# Patient Record
Sex: MALE | Race: WHITE | URBAN - METROPOLITAN AREA
[De-identification: names, ages, dates, MRNs, and addresses within clinical notes are randomized per-mention and may not be internally consistent; named-entity substitution may affect disease eponyms.]

---

## 2017-01-04 ENCOUNTER — ALLIED HEALTH/NURSE VISIT (OUTPATIENT)
Dept: NURSING | Facility: CLINIC | Age: 37
End: 2017-01-04
Payer: COMMERCIAL

## 2017-01-04 DIAGNOSIS — J02.0 ACUTE STREPTOCOCCAL PHARYNGITIS: Primary | ICD-10-CM

## 2017-01-04 LAB
DEPRECATED S PYO AG THROAT QL EIA: NORMAL
MICRO REPORT STATUS: NORMAL
SPECIMEN SOURCE: NORMAL

## 2017-01-04 PROCEDURE — 87081 CULTURE SCREEN ONLY: CPT | Performed by: PEDIATRICS

## 2017-01-04 PROCEDURE — 87880 STREP A ASSAY W/OPTIC: CPT | Performed by: PEDIATRICS

## 2017-01-04 PROCEDURE — 99207 ZZC NO CHARGE NURSE ONLY: CPT

## 2017-01-04 NOTE — Clinical Note
[facility]  [address] [facility] [place] [address]  [phone]    January 9, 2017       Cash Waggoner  [address]  [place] [address]        Cash     The laboratory tests drawn at your child's last visit all returned with normal results.    Results for orders placed or performed in visit on 01/04/17   Strep, Rapid Screen   Result Value Ref Range    Specimen Description Throat     Rapid Strep A Screen       NEGATIVE: No Group A streptococcal antigen detected by immunoassay, await   culture report.      Micro Report Status FINAL 01/04/2017    Beta strep group A culture   Result Value Ref Range    Specimen Description Throat     Culture Micro No Beta Streptococcus isolated     Micro Report Status FINAL 01/06/2017      If you have any questions or concerns please call the clinic at [phone].    Kerline Peng M.D/leonora

## 2017-01-06 LAB
BACTERIA SPEC CULT: NORMAL
MICRO REPORT STATUS: NORMAL
SPECIMEN SOURCE: NORMAL

## 2017-01-06 NOTE — PROGRESS NOTES
Quick Note:    The laboratory tests drawn at your child's last visit all returned with normal results.    Please call me if you have any questions that can not wait until our next visit in the clinic.    Dr. Peng  ______

## 2019-08-01 ENCOUNTER — OFFICE VISIT (OUTPATIENT)
Dept: ORTHOPEDICS | Facility: CLINIC | Age: 39
End: 2019-08-01
Payer: COMMERCIAL

## 2019-08-01 ENCOUNTER — ANCILLARY PROCEDURE (OUTPATIENT)
Dept: GENERAL RADIOLOGY | Facility: CLINIC | Age: 39
End: 2019-08-01
Attending: FAMILY MEDICINE
Payer: COMMERCIAL

## 2019-08-01 VITALS — DIASTOLIC BLOOD PRESSURE: 84 MMHG | HEIGHT: 71 IN | SYSTOLIC BLOOD PRESSURE: 130 MMHG

## 2019-08-01 DIAGNOSIS — M25.531 ACUTE PAIN OF RIGHT WRIST: ICD-10-CM

## 2019-08-01 DIAGNOSIS — M25.521 RIGHT ELBOW PAIN: Primary | ICD-10-CM

## 2019-08-01 DIAGNOSIS — M25.521 RIGHT ELBOW PAIN: ICD-10-CM

## 2019-08-01 PROCEDURE — 99203 OFFICE O/P NEW LOW 30 MIN: CPT | Performed by: FAMILY MEDICINE

## 2019-08-01 PROCEDURE — 73110 X-RAY EXAM OF WRIST: CPT | Mod: RT

## 2019-08-01 PROCEDURE — 73080 X-RAY EXAM OF ELBOW: CPT | Mod: RT

## 2019-08-01 NOTE — LETTER
8/1/2019         RE: Cash Waggoner  [address] [facility]  [place] [address]        Dear Colleague,    Thank you for referring your patient, Cash Waggoner, to the [facility] [place]. Please see a copy of my visit note below.    ASSESSMENT & PLAN  Cash was seen today for pain.    Diagnoses and all orders for this visit:    Right elbow pain  -     XR Elbow RT G/E 3 vw; Future    Acute pain of right wrist  -     XR Wrist Right G/E 3 Views; Future      Patient is a 39 year old male presenting for evaluation of   Chief Complaint   Patient presents with     Right Elbow - Pain      # Proximal Radius Fracture: Non-displaced fx after fall on 7/31/19.  Pt has swelling and TTP over radial head.  Plan to place in sling and f/u in one week for repeat imaging with plan for 3 weeks of immobilization    # Right Wrist Pain: Notable in snuffbox mild in nature after FOOSH injury.  Initial XR neg for fx.  Plan to rest and reevaluate next week.  Likely wrist sprain unless new/worsening pain noted    Treatment: Right arm sling  Physical Therapy none  Injection none  Medications  Limited NSAIDs/Tylenol    Concerning signs/sx that would warrant urgent evaluation were discussed.  All questions were answered, patient understands and agrees with plan.      Return in about 1 week (around 8/8/2019).    -----    SUBJECTIVE  Cash Waggoner is a/an 39 year old Right handed male who is seen as a self referral for evaluation of right elbow pain. The patient is seen with their wife.    Onset: 7/31/19, 1 day(s) ago. Patient describes injury as chasing kids, fell backwards. FOOSH injury.   Location of Pain: right elbow to hand   Rating of Pain at worst: 7/10  Rating of Pain Currently: 3/10  Worsened by: extension, flexion   Better with: rest  Treatments tried: elevation, ice, sling, Ibuprofen   Associated symptoms: swelling  Orthopedic history: NO  Relevant surgical history: NO  History reviewed. No pertinent  "past medical history.  Social History     Socioeconomic History     Marital status:      Spouse name: Not on file     Number of children: Not on file     Years of education: Not on file     Highest education level: Not on file   Occupational History     Not on file   Social Needs     Financial resource strain: Not on file     Food insecurity:     Worry: Not on file     Inability: Not on file     Transportation needs:     Medical: Not on file     Non-medical: Not on file   Tobacco Use     Smoking status: Not on file   Substance and Sexual Activity     Alcohol use: Not on file     Drug use: Not on file     Sexual activity: Not on file   Lifestyle     Physical activity:     Days per week: Not on file     Minutes per session: Not on file     Stress: Not on file   Relationships     Social connections:     Talks on phone: Not on file     Gets together: Not on file     Attends Mormonism service: Not on file     Active member of club or organization: Not on file     Attends meetings of clubs or organizations: Not on file     Relationship status: Not on file     Intimate partner violence:     Fear of current or ex partner: Not on file     Emotionally abused: Not on file     Physically abused: Not on file     Forced sexual activity: Not on file   Other Topics Concern     Not on file   Social History Narrative     Not on file       Patient's past medical, surgical, social, and family histories were reviewed today and no changes are noted.    REVIEW OF SYSTEMS:  10 point ROS is negative other than symptoms noted above in HPI, Past Medical History or as stated below  Constitutional: NEGATIVE for fever, chills, change in weight  Skin: NEGATIVE for worrisome rashes, moles or lesions  GI/: NEGATIVE for bowel or bladder changes  Neuro: NEGATIVE for weakness, dizziness or paresthesias    OBJECTIVE:  /84   Ht 1.803 m (5' 11\")    General: healthy, alert and in no distress  HEENT: no scleral icterus or conjunctival " erythema  Skin: no suspicious lesions or rash. No jaundice.  CV: regular rhythm by palpation  Resp: normal respiratory effort without conversational dyspnea   Psych: normal mood and affect  Gait: normal steady gait with appropriate coordination and balance  Neuro: Normal sensory exam of bilateral hands.   MSK:  RIGHT ELBOW  Inspection:  Swelling over lateral/posterior elbow  Palpation:    Tender about the radial head/neck. Remainder of bony, ligamentous and tendinous landmarks are nontender.    Crepitus is Absent  Range of Motion:     Extension 15 / flexion 120 / pronation limited by pain, limited by stiffness / supination limited by pain, limited by stiffness  Strength:    Flexion limited slightly by pain extension limited substantially by pain pronation limited slightly by pain supination limited substantially by pain  Special Tests:    Positive: Pain with resisted wrist extension    Negative: pain with resisted wrist flexion    Independent visualization of the below image:  Recent Results (from the past 24 hour(s))   XR Elbow RT G/E 3 vw    Narrative    XR RIGHT ELBOW THREE OR MORE VIEWS   8/1/2019 6:11 PM     HISTORY: Right elbow pain.    COMPARISON: None.    FINDINGS: There is a subtle lucency extending through the radial head  longitudinally on the oblique view concerning for radial head  fracture. Additionally, there are anterior and posterior fat pad  elevation which also correspond with an elbow fracture. No other  fractures are seen. Joint spaces are grossly well-maintained.      Impression    IMPRESSION: Not significantly displaced radial head fracture as  described above.    SHAMIKA PARIKH MD   XR Wrist Right G/E 3 Views    Narrative    XR WRIST RT G/E 3 VW 8/1/2019 6:30 PM     HISTORY: Pain following injury one day prior.    COMPARISON: None.      Impression    IMPRESSION: No fractures are identified. Normal alignment of the  carpus. Accessory ossicle adjacent to the ulnar styloid.    SAMANTA DARLING MD        Patient's conditions were thoroughly discussed during today's visit with greater than 50% of the visit spent counseling the patient with total time spent face-to-face with the patient being 30 minutes.    Anders Gibson MD, [facility]        Again, thank you for allowing me to participate in the care of your patient.        Sincerely,        Anders Gibson MD

## 2019-08-01 NOTE — PATIENT INSTRUCTIONS
Diagnosis: Right proximal radial fracture  Image Findings: Non-displaced radial fracture  Treatment: Sling  Medications: Limited tylenol/ibuprofen  Follow-up: 1 week    Patient Education     Elbow Fracture    You have a break, or fracture, of the elbow. That means you have a crack or break in one or more of the bones of the elbow joint. Fractures usually take 4 to 12 weeks to heal, depending on the type. Initial treatment is with a splint or cast. Severe fractures may need surgery to put the bone fragments back into place.  The elbow joint is formed by 3 arm bones:    Radius. This is the bone on the thumb side of the forearm.    Ulna. This is the bone on the little-finger side of the forearm. The ulna forms the tip of the elbow.    Humerus. This is the upper arm bone that connects to the shoulder.  Home care    Keep your arm raised to reduce pain and swelling. When sitting or lying down raise your arm above heart level. You can do this by placing your arm on a pillow that rests on your chest or on a pillow at your side. This is most important during the first 48 hours after injury.    Apply an ice pack over the injured area for 15 to 20 minutes every 3 to 6 hours. You should do this for the first 24 to 48 hours. To make an ice pack, put ice cubes in a plastic bag that seals at the top. Wrap the bag in a clean, thin towel or cloth. Never put ice or an ice pack directly on your skin. You can place the ice pack inside the sling and directly over the splint. Keep using ice packs to ease pain and swelling as needed. As the ice melts, be careful to not get your wrap, splint, or cast wet. After 48 hours, apply heat (warm shower or warm bath) for 15 to 20 minutes several times a day. Or switch between ice and heat.    If you were given a sling and splint, leave it in place for the time advised. Keep the splint completely dry at all times. Bathe with your splint out of the water protected with 2 large plastic bags, one  outside of the other, each sealed with duct tape or rubber bands at the top end. If a fiberglass splint or cast gets wet, you can dry it with a hair dryer on a cool setting.    If you were given a sling only, wear it for the first week. Unless told otherwise, slowly begin range of motion exercises, after the first week, or as advised by your healthcare provider.    You may use over-the-counter pain medicine to control pain, unless another pain medicine was prescribed. If you have chronic liver or kidney disease or ever had a stomach ulcer or GI (gastrointestinal) bleeding, talk with your healthcare provider about using these medicines.  Follow-up care  Follow up with your healthcare provider, or as advised.  An elbow joint will become stiff if kept still (immobile) for too long. Ask your healthcare provider when to begin range of motion exercises to prevent the elbow from getting stiff. If X-rays were taken, you will be told if there are any new findings that may affect your care.  When to seek medical advice  Call your healthcare provider right away if any of these occur:    The plaster splint or cast becomes wet or soft    The splint or cast becomes loose    The fiberglass splint or cast remains wet for more than 24 hours    Increased tightness or pain develops in the elbow    A bad smell comes from the cast or splint    Fingers become swollen, cold, blue, numb, or tingly  Date Last Reviewed: 4/1/2018 2000-2018 [employer]. [address] [place] [address]. All rights reserved. This information is not intended as a substitute for professional medical care. Always follow your healthcare professional's instructions.

## 2019-08-01 NOTE — PROGRESS NOTES
ASSESSMENT & PLAN  Cash was seen today for pain.    Diagnoses and all orders for this visit:    Right elbow pain  -     XR Elbow RT G/E 3 vw; Future    Acute pain of right wrist  -     XR Wrist Right G/E 3 Views; Future      Patient is a 39 year old male presenting for evaluation of   Chief Complaint   Patient presents with     Right Elbow - Pain      # Proximal Radius Fracture: Non-displaced fx after fall on 7/31/19.  Pt has swelling and TTP over radial head.  Plan to place in sling and f/u in one week for repeat imaging with plan for 3 weeks of immobilization    # Right Wrist Pain: Notable in snuffbox mild in nature after FOOSH injury.  Initial XR neg for fx.  Plan to rest and reevaluate next week.  Likely wrist sprain unless new/worsening pain noted    Treatment: Right arm sling  Physical Therapy none  Injection none  Medications  Limited NSAIDs/Tylenol    Concerning signs/sx that would warrant urgent evaluation were discussed.  All questions were answered, patient understands and agrees with plan.      Return in about 1 week (around 8/8/2019).    -----    SUBJECTIVE  Cash Waggoner is a/an 39 year old Right handed male who is seen as a self referral for evaluation of right elbow pain. The patient is seen with their wife.    Onset: 7/31/19, 1 day(s) ago. Patient describes injury as chasing kids, fell backwards. FOOSH injury.   Location of Pain: right elbow to hand   Rating of Pain at worst: 7/10  Rating of Pain Currently: 3/10  Worsened by: extension, flexion   Better with: rest  Treatments tried: elevation, ice, sling, Ibuprofen   Associated symptoms: swelling  Orthopedic history: NO  Relevant surgical history: NO  History reviewed. No pertinent past medical history.  Social History     Socioeconomic History     Marital status:      Spouse name: Not on file     Number of children: Not on file     Years of education: Not on file     Highest education level: Not on file   Occupational History     Not  "on file   Social Needs     Financial resource strain: Not on file     Food insecurity:     Worry: Not on file     Inability: Not on file     Transportation needs:     Medical: Not on file     Non-medical: Not on file   Tobacco Use     Smoking status: Not on file   Substance and Sexual Activity     Alcohol use: Not on file     Drug use: Not on file     Sexual activity: Not on file   Lifestyle     Physical activity:     Days per week: Not on file     Minutes per session: Not on file     Stress: Not on file   Relationships     Social connections:     Talks on phone: Not on file     Gets together: Not on file     Attends Catholic service: Not on file     Active member of club or organization: Not on file     Attends meetings of clubs or organizations: Not on file     Relationship status: Not on file     Intimate partner violence:     Fear of current or ex partner: Not on file     Emotionally abused: Not on file     Physically abused: Not on file     Forced sexual activity: Not on file   Other Topics Concern     Not on file   Social History Narrative     Not on file       Patient's past medical, surgical, social, and family histories were reviewed today and no changes are noted.    REVIEW OF SYSTEMS:  10 point ROS is negative other than symptoms noted above in HPI, Past Medical History or as stated below  Constitutional: NEGATIVE for fever, chills, change in weight  Skin: NEGATIVE for worrisome rashes, moles or lesions  GI/: NEGATIVE for bowel or bladder changes  Neuro: NEGATIVE for weakness, dizziness or paresthesias    OBJECTIVE:  /84   Ht 1.803 m (5' 11\")    General: healthy, alert and in no distress  HEENT: no scleral icterus or conjunctival erythema  Skin: no suspicious lesions or rash. No jaundice.  CV: regular rhythm by palpation  Resp: normal respiratory effort without conversational dyspnea   Psych: normal mood and affect  Gait: normal steady gait with appropriate coordination and balance  Neuro: " Normal sensory exam of bilateral hands.   MSK:  RIGHT ELBOW  Inspection:  Swelling over lateral/posterior elbow  Palpation:    Tender about the radial head/neck. Remainder of bony, ligamentous and tendinous landmarks are nontender.    Crepitus is Absent  Range of Motion:     Extension 15 / flexion 120 / pronation limited by pain, limited by stiffness / supination limited by pain, limited by stiffness  Strength:    Flexion limited slightly by pain extension limited substantially by pain pronation limited slightly by pain supination limited substantially by pain  Special Tests:    Positive: Pain with resisted wrist extension    Negative: pain with resisted wrist flexion    Independent visualization of the below image:  Recent Results (from the past 24 hour(s))   XR Elbow RT G/E 3 vw    Narrative    XR RIGHT ELBOW THREE OR MORE VIEWS   8/1/2019 6:11 PM     HISTORY: Right elbow pain.    COMPARISON: None.    FINDINGS: There is a subtle lucency extending through the radial head  longitudinally on the oblique view concerning for radial head  fracture. Additionally, there are anterior and posterior fat pad  elevation which also correspond with an elbow fracture. No other  fractures are seen. Joint spaces are grossly well-maintained.      Impression    IMPRESSION: Not significantly displaced radial head fracture as  described above.    SHAMIKA PARIKH MD   XR Wrist Right G/E 3 Views    Narrative    XR WRIST RT G/E 3 VW 8/1/2019 6:30 PM     HISTORY: Pain following injury one day prior.    COMPARISON: None.      Impression    IMPRESSION: No fractures are identified. Normal alignment of the  carpus. Accessory ossicle adjacent to the ulnar styloid.    SAMANTA DARLING MD       Patient's conditions were thoroughly discussed during today's visit with greater than 50% of the visit spent counseling the patient with total time spent face-to-face with the patient being 30 minutes.    Anders Gibson MD, [facility]

## 2019-08-08 ENCOUNTER — OFFICE VISIT (OUTPATIENT)
Dept: ORTHOPEDICS | Facility: CLINIC | Age: 39
End: 2019-08-08
Payer: COMMERCIAL

## 2019-08-08 ENCOUNTER — ANCILLARY PROCEDURE (OUTPATIENT)
Dept: GENERAL RADIOLOGY | Facility: CLINIC | Age: 39
End: 2019-08-08
Attending: FAMILY MEDICINE
Payer: COMMERCIAL

## 2019-08-08 VITALS — HEIGHT: 71 IN | SYSTOLIC BLOOD PRESSURE: 132 MMHG | DIASTOLIC BLOOD PRESSURE: 78 MMHG

## 2019-08-08 DIAGNOSIS — M25.521 RIGHT ELBOW PAIN: ICD-10-CM

## 2019-08-08 DIAGNOSIS — S52.124D CLOSED NONDISPLACED FRACTURE OF HEAD OF RIGHT RADIUS WITH ROUTINE HEALING, SUBSEQUENT ENCOUNTER: Primary | ICD-10-CM

## 2019-08-08 PROCEDURE — 99214 OFFICE O/P EST MOD 30 MIN: CPT | Performed by: FAMILY MEDICINE

## 2019-08-08 PROCEDURE — 73080 X-RAY EXAM OF ELBOW: CPT | Mod: RT

## 2019-08-08 NOTE — PATIENT INSTRUCTIONS
Diagnosis: Right Radial Head Fracture  Image Findings: Stable fracture at radial head  Treatment: Sling as needed over the next week then out completely  Medications: Limited tylenol/ibuprofen  Follow-up: 2 weeks if pain not completely resolved    It was great seeing you again today!    Anders Gibson

## 2019-08-08 NOTE — PROGRESS NOTES
.ASSESSMENT & PLAN  Cash was seen today for pain.    Diagnoses and all orders for this visit:    Closed nondisplaced fracture of head of right radius with routine healing, subsequent encounter    Right elbow pain  -     XR Elbow RT G/E 3 vw; Future      Patient is a 39 year old male presenting for evaluation of   Chief Complaint   Patient presents with     Right Elbow - Pain      # Proximal Radius Fracture: Non-displaced fx after fall on 7/31/19.  Pain sig improved very mild with end flexion and mild TTP over radial head.  XR showing stable alignment.  Plan to have pt be in sling over next week as needed then out completely.  F/u in 2 weeks if pain not completely resolved.    # Right Wrist Pain: Pain resolved likely wrist sprain f/u PRN for this.      Treatment: Right arm sling as needed for one week then out completely  Physical Therapy none  Injection none  Medications  Limited NSAIDs/Tylenol    Concerning signs/sx that would warrant urgent evaluation were discussed.  All questions were answered, patient understands and agrees with plan.      Return in about 2 weeks (around 8/22/2019).    -----    SUBJECTIVE  Cash Waggoner is a/an 39 year old Right handed male who is seen as a self referral for evaluation of right elbow pain. The patient is seen with their wife.    Onset: 7/31/19, 1 day(s) ago. Patient describes injury as chasing kids, fell backwards. FOOSH injury.   Location of Pain: right elbow to hand   Rating of Pain at worst: 7/10  Rating of Pain Currently: 3/10  Worsened by: extension, flexion   Better with: rest  Treatments tried: elevation, ice, sling, Ibuprofen   Associated symptoms: swelling  Orthopedic history: NO  Relevant surgical history: NO    Interim History - August 8, 2019  Since last visit on 8/1/2019 patient has nearly resolved pain at elbow, very mild at wrist.  No interim injury.       History reviewed. No pertinent past medical history.  Social History     Socioeconomic History      "Marital status:      Spouse name: Not on file     Number of children: Not on file     Years of education: Not on file     Highest education level: Not on file   Occupational History     Not on file   Social Needs     Financial resource strain: Not on file     Food insecurity:     Worry: Not on file     Inability: Not on file     Transportation needs:     Medical: Not on file     Non-medical: Not on file   Tobacco Use     Smoking status: Not on file   Substance and Sexual Activity     Alcohol use: Not on file     Drug use: Not on file     Sexual activity: Not on file   Lifestyle     Physical activity:     Days per week: Not on file     Minutes per session: Not on file     Stress: Not on file   Relationships     Social connections:     Talks on phone: Not on file     Gets together: Not on file     Attends Bahai service: Not on file     Active member of club or organization: Not on file     Attends meetings of clubs or organizations: Not on file     Relationship status: Not on file     Intimate partner violence:     Fear of current or ex partner: Not on file     Emotionally abused: Not on file     Physically abused: Not on file     Forced sexual activity: Not on file   Other Topics Concern     Not on file   Social History Narrative     Not on file       Patient's past medical, surgical, social, and family histories were reviewed today and no changes are noted.    No family history pertinent to the patient's problem today      REVIEW OF SYSTEMS:  10 point ROS is negative other than symptoms noted above in HPI, Past Medical History or as stated below  Constitutional: NEGATIVE for fever, chills, change in weight  Skin: NEGATIVE for worrisome rashes, moles or lesions  GI/: NEGATIVE for bowel or bladder changes  Neuro: NEGATIVE for weakness, dizziness or paresthesias    OBJECTIVE:  /78   Ht 1.803 m (5' 11\")    General: healthy, alert and in no distress  HEENT: no scleral icterus or conjunctival " erythema  Skin: no suspicious lesions or rash. No jaundice.  CV: regular rhythm by palpation  Resp: normal respiratory effort without conversational dyspnea   Psych: normal mood and affect  Gait: normal steady gait with appropriate coordination and balance  Neuro: Normal sensory exam of bilateral hands.   MSK:  RIGHT ELBOW  Inspection:  Resolved swelling over lateral/posterior elbow  Palpation:    Very mild TTP about the radial head/neck. Remainder of bony, ligamentous and tendinous landmarks are nontender.    Crepitus is Absent  Range of Motion:     Extension 0 / flexion 145 mild pain / pronation full / supination full  Strength:    Flexion full, extension full, pronation full, supination full  Special Tests:    Positive: None    Negative: pain with resisted wrist flexion/extension    Hand/wrist (Right):  No deformity noted.  No edema, ecchymosis.  Full range of motion in forearm with pronation and supination.  Full range of motion in wrist with flexion, extension, radial and ulnar deviation.  Full  and intrinsic strength.  Radial pulses normal, +2/4, capillary refill brisk.  No tenderness to palpation over distal radius, ulna, carpal bones, metacarpals, fingers.  No tenderness thenar or hypothenar eminences.  No snuffbox tenderness.  Tinel, Phalen signs negative.  Finkelstein negative.  Intact and symmetric strength and sensation in the median/radial/unlar distributions bilaterally    Independent visualization of the below image:  Recent Results (from the past 24 hour(s))   XR Elbow RT G/E 3 vw    Narrative    RIGHT ELBOW THREE VIEWS   8/8/2019 9:30 AM    HISTORY: Right elbow pain. Fracture follow-up.    COMPARISON: 8/1/2019      Impression    IMPRESSION: There has been no definite change in the subtle linear  lucency involving the articular surface of the radial head consistent  with a fracture. No definite progression of healing is seen. There has  been resolution of the previously seen joint effusion. No  other change  or abnormality is noted.     ANGELA DYSON MD     Patient's conditions were thoroughly discussed during today's visit with greater than 50% of the visit spent counseling the patient with total time spent face-to-face with the patient being 30 minutes.    Anders Gibson MD, [facility]

## 2019-08-08 NOTE — LETTER
8/8/2019         RE: Cash Waggoner  [id number] [facility]  [place] [address]        Dear Colleague,    Thank you for referring your patient, Cash Waggoner, to the [facility] [place]. Please see a copy of my visit note below.    .ASSESSMENT & PLAN  Cash was seen today for pain.    Diagnoses and all orders for this visit:    Closed nondisplaced fracture of head of right radius with routine healing, subsequent encounter    Right elbow pain  -     XR Elbow RT G/E 3 vw; Future      Patient is a 39 year old male presenting for evaluation of   Chief Complaint   Patient presents with     Right Elbow - Pain      # Proximal Radius Fracture: Non-displaced fx after fall on 7/31/19.  Pain sig improved very mild with end flexion and mild TTP over radial head.  XR showing stable alignment.  Plan to have pt be in sling over next week as needed then out completely.  F/u in 2 weeks if pain not completely resolved.    # Right Wrist Pain: Pain resolved likely wrist sprain f/u PRN for this.      Treatment: Right arm sling as needed for one week then out completely  Physical Therapy none  Injection none  Medications  Limited NSAIDs/Tylenol    Concerning signs/sx that would warrant urgent evaluation were discussed.  All questions were answered, patient understands and agrees with plan.      Return in about 2 weeks (around 8/22/2019).    -----    SUBJECTIVE  Cash Waggoner is a/an 39 year old Right handed male who is seen as a self referral for evaluation of right elbow pain. The patient is seen with their wife.    Onset: 7/31/19, 1 day(s) ago. Patient describes injury as chasing kids, fell backwards. FOOSH injury.   Location of Pain: right elbow to hand   Rating of Pain at worst: 7/10  Rating of Pain Currently: 3/10  Worsened by: extension, flexion   Better with: rest  Treatments tried: elevation, ice, sling, Ibuprofen   Associated symptoms: swelling  Orthopedic history: NO  Relevant surgical  history: NO    Interim History - August 8, 2019  Since last visit on 8/1/2019 patient has nearly resolved pain at elbow, very mild at wrist.  No interim injury.       History reviewed. No pertinent past medical history.  Social History     Socioeconomic History     Marital status:      Spouse name: Not on file     Number of children: Not on file     Years of education: Not on file     Highest education level: Not on file   Occupational History     Not on file   Social Needs     Financial resource strain: Not on file     Food insecurity:     Worry: Not on file     Inability: Not on file     Transportation needs:     Medical: Not on file     Non-medical: Not on file   Tobacco Use     Smoking status: Not on file   Substance and Sexual Activity     Alcohol use: Not on file     Drug use: Not on file     Sexual activity: Not on file   Lifestyle     Physical activity:     Days per week: Not on file     Minutes per session: Not on file     Stress: Not on file   Relationships     Social connections:     Talks on phone: Not on file     Gets together: Not on file     Attends Christian service: Not on file     Active member of club or organization: Not on file     Attends meetings of clubs or organizations: Not on file     Relationship status: Not on file     Intimate partner violence:     Fear of current or ex partner: Not on file     Emotionally abused: Not on file     Physically abused: Not on file     Forced sexual activity: Not on file   Other Topics Concern     Not on file   Social History Narrative     Not on file       Patient's past medical, surgical, social, and family histories were reviewed today and no changes are noted.    No family history pertinent to the patient's problem today      REVIEW OF SYSTEMS:  10 point ROS is negative other than symptoms noted above in HPI, Past Medical History or as stated below  Constitutional: NEGATIVE for fever, chills, change in weight  Skin: NEGATIVE for worrisome rashes,  "moles or lesions  GI/: NEGATIVE for bowel or bladder changes  Neuro: NEGATIVE for weakness, dizziness or paresthesias    OBJECTIVE:  /78   Ht 1.803 m (5' 11\")    General: healthy, alert and in no distress  HEENT: no scleral icterus or conjunctival erythema  Skin: no suspicious lesions or rash. No jaundice.  CV: regular rhythm by palpation  Resp: normal respiratory effort without conversational dyspnea   Psych: normal mood and affect  Gait: normal steady gait with appropriate coordination and balance  Neuro: Normal sensory exam of bilateral hands.   MSK:  RIGHT ELBOW  Inspection:  Resolved swelling over lateral/posterior elbow  Palpation:    Very mild TTP about the radial head/neck. Remainder of bony, ligamentous and tendinous landmarks are nontender.    Crepitus is Absent  Range of Motion:     Extension 0 / flexion 145 mild pain / pronation full / supination full  Strength:    Flexion full, extension full, pronation full, supination full  Special Tests:    Positive: None    Negative: pain with resisted wrist flexion/extension    Hand/wrist (Right):  No deformity noted.  No edema, ecchymosis.  Full range of motion in forearm with pronation and supination.  Full range of motion in wrist with flexion, extension, radial and ulnar deviation.  Full  and intrinsic strength.  Radial pulses normal, +2/4, capillary refill brisk.  No tenderness to palpation over distal radius, ulna, carpal bones, metacarpals, fingers.  No tenderness thenar or hypothenar eminences.  No snuffbox tenderness.  Tinel, Phalen signs negative.  Finkelstein negative.  Intact and symmetric strength and sensation in the median/radial/unlar distributions bilaterally    Independent visualization of the below image:  Recent Results (from the past 24 hour(s))   XR Elbow RT G/E 3 vw    Narrative    RIGHT ELBOW THREE VIEWS   8/8/2019 9:30 AM    HISTORY: Right elbow pain. Fracture follow-up.    COMPARISON: 8/1/2019      Impression    IMPRESSION: " There has been no definite change in the subtle linear  lucency involving the articular surface of the radial head consistent  with a fracture. No definite progression of healing is seen. There has  been resolution of the previously seen joint effusion. No other change  or abnormality is noted.     ANGELA DYSON MD     Patient's conditions were thoroughly discussed during today's visit with greater than 50% of the visit spent counseling the patient with total time spent face-to-face with the patient being 30 minutes.    Anders Gibson MD, [facility]      Again, thank you for allowing me to participate in the care of your patient.        Sincerely,        Anders Gibson MD

## 2021-05-26 ENCOUNTER — RECORDS - HEALTHEAST (OUTPATIENT)
Dept: ADMINISTRATIVE | Facility: CLINIC | Age: 41
End: 2021-05-26